# Patient Record
Sex: MALE | Race: WHITE | NOT HISPANIC OR LATINO | Employment: FULL TIME | URBAN - METROPOLITAN AREA
[De-identification: names, ages, dates, MRNs, and addresses within clinical notes are randomized per-mention and may not be internally consistent; named-entity substitution may affect disease eponyms.]

---

## 2022-12-05 ENCOUNTER — HOSPITAL ENCOUNTER (EMERGENCY)
Facility: HOSPITAL | Age: 27
Discharge: HOME | End: 2022-12-06
Attending: EMERGENCY MEDICINE
Payer: MEDICAID

## 2022-12-05 DIAGNOSIS — L02.91 ABSCESS: Primary | ICD-10-CM

## 2022-12-05 PROCEDURE — 10061 I&D ABSCESS COMP/MULTIPLE: CPT

## 2022-12-05 PROCEDURE — 25000003 PHARM REV CODE 250: Performed by: STUDENT IN AN ORGANIZED HEALTH CARE EDUCATION/TRAINING PROGRAM

## 2022-12-05 PROCEDURE — 99283 EMERGENCY DEPT VISIT LOW MDM: CPT | Mod: 25

## 2022-12-05 RX ORDER — SULFAMETHOXAZOLE AND TRIMETHOPRIM 800; 160 MG/1; MG/1
1 TABLET ORAL 2 TIMES DAILY
Qty: 14 TABLET | Refills: 0 | Status: SHIPPED | OUTPATIENT
Start: 2022-12-05 | End: 2022-12-12

## 2022-12-05 RX ORDER — LIDOCAINE HYDROCHLORIDE 10 MG/ML
10 INJECTION, SOLUTION EPIDURAL; INFILTRATION; INTRACAUDAL; PERINEURAL
Status: COMPLETED | OUTPATIENT
Start: 2022-12-05 | End: 2022-12-05

## 2022-12-05 RX ADMIN — LIDOCAINE HYDROCHLORIDE 100 MG: 10 INJECTION, SOLUTION EPIDURAL; INFILTRATION; INTRACAUDAL; PERINEURAL at 11:12

## 2022-12-06 VITALS
HEIGHT: 74 IN | SYSTOLIC BLOOD PRESSURE: 144 MMHG | RESPIRATION RATE: 16 BRPM | TEMPERATURE: 98 F | OXYGEN SATURATION: 99 % | DIASTOLIC BLOOD PRESSURE: 80 MMHG | BODY MASS INDEX: 28.23 KG/M2 | HEART RATE: 90 BPM | WEIGHT: 220 LBS

## 2022-12-06 NOTE — DISCHARGE INSTRUCTIONS
Have packing removed in 2 days.  Keep wound clean and dry.    Please read all discharge instructions. Follow all written and verbal discharge instructions. Return to the nearest emergency room for any new or worsening symptoms, non-resolution of your current symptoms or if you feel you need reevaluation. Continue all home medications as prescribed and start any new prescriptions given to you in the emergency room. Followup with your primary care and make them aware of your recent ER visit. Followup with any additional providers or specialists as discussed. Do not hesitate to contact the emergency department if you have questions about your diagnosis, discharge instructions, followup recommendations, or medications.

## 2022-12-06 NOTE — ED PROVIDER NOTES
Encounter Date: 12/5/2022       History     Chief Complaint   Patient presents with    Abscess     Left armpit     27-year-old male no significant past medical history presents emergency room for evaluation of left axillary abscess.  Patient states he shaved without a guard a proximally 2-3 weeks ago, noticed abscess forming a proximally 1 week ago.  He states that he was able to drain it but then had a secondary abscess forearm after that 2 days ago.  He presents today secondary to worsening pain.  No fevers, chills.  No history of MRSA.    Review of patient's allergies indicates:  No Known Allergies  No past medical history on file.  No past surgical history on file.  No family history on file.     Review of Systems   Constitutional:  Negative for chills, fatigue and fever.   HENT:  Negative for congestion, hearing loss, sore throat and trouble swallowing.    Eyes:  Negative for visual disturbance.   Respiratory:  Negative for cough, chest tightness and shortness of breath.    Cardiovascular:  Negative for chest pain.   Gastrointestinal:  Negative for abdominal pain and nausea.   Endocrine: Negative for polyuria.   Genitourinary:  Negative for difficulty urinating.   Musculoskeletal:  Negative for arthralgias and myalgias.   Skin:  Positive for wound. Negative for rash.   Neurological:  Negative for dizziness and headaches.   Psychiatric/Behavioral:  The patient is not nervous/anxious.      Physical Exam     Initial Vitals [12/05/22 2325]   BP Pulse Resp Temp SpO2   (!) 147/82 95 16 97.5 °F (36.4 °C) 98 %      MAP       --         Physical Exam    Nursing note and vitals reviewed.  Constitutional: He appears well-developed and well-nourished.   HENT:   Head: Normocephalic and atraumatic.   Eyes: Conjunctivae and EOM are normal.   Neck: Neck supple.   Cardiovascular:  Normal rate and intact distal pulses.           Pulmonary/Chest: No respiratory distress.   Musculoskeletal:         General: Normal range of motion.       Cervical back: Neck supple.     Neurological: He is alert and oriented to person, place, and time. GCS score is 15. GCS eye subscore is 4. GCS verbal subscore is 5. GCS motor subscore is 6.   Skin: Skin is warm and dry. Capillary refill takes less than 2 seconds.   Two areas of erythema and fluctuance, both approximately 1 x 1 cm to the left axilla.   Psychiatric: He has a normal mood and affect. His behavior is normal. Judgment and thought content normal.       ED Course   ED US LTD Soft Tissue/Skin    Date/Time: 12/5/2022 11:57 PM  Performed by: Horacio Vanegas PA-C  Authorized by: Chris Gilliam MD     Procedure:  Focused Soft Tissue Ultrasound Exam  Charge?:  Yes  I & D - Incision and Drainage    Date/Time: 12/5/2022 11:58 PM  Location procedure was performed: [facility] EMERGENCY DEPARTMENT  Performed by: Horacio Vanegas PA-C  Authorized by: Chris Gilliam MD   Pre-operative diagnosis: abscess  Post-operative diagnosis: abscess  Location: Left axilla.  Anesthesia: local infiltration    Anesthesia:  Local Anesthetic: lidocaine 1% with epinephrine  Risk factor: underlying major vessel  Scalpel size: 11  Incision type: single straight  Complexity: complex  Drainage: purulent  Drainage amount: moderate  Wound treatment: incision, drainage, expression of material, deloculation and wound packed  Packing material: 1/4 in gauze  Patient tolerance: Patient tolerated the procedure well with no immediate complications      I & D - Incision and Drainage    Date/Time: 12/6/2022 12:01 AM  Location procedure was performed: [facility] EMERGENCY DEPARTMENT  Performed by: Horacio Vanegas PA-C  Authorized by: Chris Gilliam MD   Pre-operative diagnosis: abscess  Post-operative diagnosis: abscess  Type: abscess  Location: Left axilla.  Anesthesia: local infiltration  Risk factor: underlying major vessel  Scalpel size: 11  Incision type: single straight  Complexity: complex  Drainage: purulent  Drainage amount:  moderate  Wound treatment: incision, drainage, expression of material, deloculation and wound packed  Packing material: 1/4 in gauze  Patient tolerance: Patient tolerated the procedure well with no immediate complications      Labs Reviewed - No data to display       Imaging Results    None          Medications   LIDOcaine (PF) 10 mg/ml (1%) injection 100 mg (100 mg Infiltration Given by Provider 12/5/22 6072)     Medical Decision Making:   Initial Assessment:   Nontoxic, well-appearing and in no acute distress.  ED Management:  27-year-old male presents emergency room for evaluation of left axillary abscess.  Patient actually had 2 separate abscesses both necessitating drainage.  I&D was performed at bedside without complication.  Both wounds were packed.  Patient will be discharged home in stable condition with recommendation for packing removal in 2 days.  [facility]      Disposition:  Improved, discharged  Plan to discharge home with appropriate follow-up, including primary care manager.    I discussed the findings and plan of care with this patient.  All questions were answered to the patient's satisfaction.  Disposition plan as above.  Verbal and written discharge instructions provided to the patient on discharge.  Return precautions discussed prior to discharge.     I discuss this patient case with the cosigning physician, who agrees with diagnosis and plan of care. This note was written using the assistance of a dictation program and may contain grammatical errors.                         Clinical Impression:   Final diagnoses:  [L02.91] Abscess (Primary)        ED Disposition Condition    Discharge Stable          ED Prescriptions       Medication Sig Dispense Start Date End Date Auth. Provider    sulfamethoxazole-trimethoprim 800-160mg (BACTRIM DS) 800-160 mg Tab Take 1 tablet by mouth 2 (two) times daily. for 7 days 14 tablet 12/5/2022 12/12/2022 Horacio Vanegas PA-C           Follow-up Information       Follow up With Specialties Details Why Contact Info Additional Information    [facility] - Emergency Dept Emergency Medicine Go to  As needed, If symptoms worsen [id number] [facility] [id number] [phone] 1st floor             Horacio Vanegas PA-C  12/06/22 0003